# Patient Record
Sex: MALE | Race: WHITE | NOT HISPANIC OR LATINO | Employment: FULL TIME | URBAN - METROPOLITAN AREA
[De-identification: names, ages, dates, MRNs, and addresses within clinical notes are randomized per-mention and may not be internally consistent; named-entity substitution may affect disease eponyms.]

---

## 2024-04-10 DIAGNOSIS — E11.40 DIABETIC NEUROPATHY, PAINFUL: Primary | ICD-10-CM

## 2024-04-10 DIAGNOSIS — G62.9 NEUROPATHY: ICD-10-CM

## 2024-05-08 ENCOUNTER — OFFICE VISIT (OUTPATIENT)
Dept: PAIN MEDICINE | Facility: CLINIC | Age: 54
End: 2024-05-08
Payer: COMMERCIAL

## 2024-05-08 VITALS
HEART RATE: 81 BPM | BODY MASS INDEX: 31.66 KG/M2 | DIASTOLIC BLOOD PRESSURE: 77 MMHG | WEIGHT: 197 LBS | HEIGHT: 66 IN | SYSTOLIC BLOOD PRESSURE: 148 MMHG

## 2024-05-08 DIAGNOSIS — M54.31 SCIATICA OF RIGHT SIDE: Primary | ICD-10-CM

## 2024-05-08 DIAGNOSIS — R29.898 WEAKNESS OF BOTH LOWER EXTREMITIES: ICD-10-CM

## 2024-05-08 DIAGNOSIS — E11.40 DIABETIC NEUROPATHY, PAINFUL: ICD-10-CM

## 2024-05-08 DIAGNOSIS — M70.61 GREATER TROCHANTERIC BURSITIS OF RIGHT HIP: ICD-10-CM

## 2024-05-08 PROCEDURE — 1159F MED LIST DOCD IN RCRD: CPT | Mod: CPTII,S$GLB,, | Performed by: PHYSICAL MEDICINE & REHABILITATION

## 2024-05-08 PROCEDURE — 99204 OFFICE O/P NEW MOD 45 MIN: CPT | Mod: S$GLB,,, | Performed by: PHYSICAL MEDICINE & REHABILITATION

## 2024-05-08 PROCEDURE — 1160F RVW MEDS BY RX/DR IN RCRD: CPT | Mod: CPTII,S$GLB,, | Performed by: PHYSICAL MEDICINE & REHABILITATION

## 2024-05-08 PROCEDURE — 4010F ACE/ARB THERAPY RXD/TAKEN: CPT | Mod: CPTII,S$GLB,, | Performed by: PHYSICAL MEDICINE & REHABILITATION

## 2024-05-08 PROCEDURE — 3078F DIAST BP <80 MM HG: CPT | Mod: CPTII,S$GLB,, | Performed by: PHYSICAL MEDICINE & REHABILITATION

## 2024-05-08 PROCEDURE — 3008F BODY MASS INDEX DOCD: CPT | Mod: CPTII,S$GLB,, | Performed by: PHYSICAL MEDICINE & REHABILITATION

## 2024-05-08 PROCEDURE — 3077F SYST BP >= 140 MM HG: CPT | Mod: CPTII,S$GLB,, | Performed by: PHYSICAL MEDICINE & REHABILITATION

## 2024-05-08 RX ORDER — ASPIRIN 81 MG/1
81 TABLET ORAL DAILY
COMMUNITY

## 2024-05-08 RX ORDER — INDOMETHACIN 50 MG/1
50 CAPSULE ORAL
COMMUNITY
Start: 2024-03-05

## 2024-05-08 RX ORDER — RIVAROXABAN 2.5 MG/1
2.5 TABLET, FILM COATED ORAL 2 TIMES DAILY
COMMUNITY
Start: 2024-04-24

## 2024-05-08 RX ORDER — ROSUVASTATIN CALCIUM 10 MG/1
10 TABLET, COATED ORAL NIGHTLY
COMMUNITY
Start: 2024-03-02

## 2024-05-08 RX ORDER — CARVEDILOL 12.5 MG/1
12.5 TABLET ORAL 2 TIMES DAILY
COMMUNITY
Start: 2024-05-06

## 2024-05-08 RX ORDER — CLOPIDOGREL BISULFATE 75 MG/1
75 TABLET ORAL DAILY
COMMUNITY

## 2024-05-08 RX ORDER — EMPAGLIFLOZIN, METFORMIN HYDROCHLORIDE 5; 1000 MG/1; MG/1
TABLET, EXTENDED RELEASE ORAL
COMMUNITY
Start: 2024-04-05

## 2024-05-08 RX ORDER — SACUBITRIL AND VALSARTAN 24; 26 MG/1; MG/1
1 TABLET, FILM COATED ORAL 2 TIMES DAILY
COMMUNITY
Start: 2024-03-15

## 2024-05-08 RX ORDER — GABAPENTIN 300 MG/1
300 CAPSULE ORAL 2 TIMES DAILY
COMMUNITY
Start: 2024-04-05

## 2024-05-08 NOTE — PROGRESS NOTES
[facility]      Referring Provider: Yasmany Lima Md  [address]  [address] [address]    Chief Complaint:   Chief Complaint   Patient presents with    Leg Pain     right       History of Present Illness: Song Quezada is a 54 y.o. male referred by Dr. Yasmany Lima for right leg (concerned about possible neuropathy).      Onset: Was having back and right leg pain that was constant, but couldn't figure out what was causing it, and he was developing an ulcer between his toes, then he went to ER at [facility] and was told he had no pulses in his right foot and he underwent angiogram at [facility] and had 2 stents placed that has improved some of the pain and has also helped his ulcer heel on his toes. He is wound care right now with Dr. BOWENS. His back pain improved after the vascular intervention, but still having some pains    Currently right pain is described as:  Location: right leg anteriorly  Radiation: right leg to the top of the foot to the middle toes.   Timing: intermittent, short bursts of shooting pain (30 seconds maybe 30 min)  Quality: Aching, Numb, Sharp, Stabbing, and Shooting  Exacerbating Factors: random, no particular time or activity  Alleviating Factors: nothing  Associated Symptoms: He has some numbness in both feet. He feels weak in both legs. He denies night fever/night sweats, urinary incontinence/change in function, bowel incontinence/change in function, and unexplained weight loss    Severity: Currently: 5/10   Typical Range: 5-6/10     Exacerbation: 6/10     Functional Limitations: Pain is limiting his ability work, walking around his house.    Employment Status: construction    P = 5  E = 6  G = 6  Baseline PEG Score = 5.67  Current PEG Score: 5.67    Opioid Risk Score         Value Time User    Opioid Risk Score  0 5/8/2024  1:13 PM Radha Bahena MA             Previous Interventions:  - Chiroptactor treatment    Previous Therapies:  PT/OT: no   Relevant  Surgery: no   Previous Medications:   - NSAIDS: indomethacin  - Muscle Relaxants:    - TCAs:   - SNRIs:   - Topicals:   - Anticonvulsants: gabapentin   - Opioids:     Current Pain Medications:  Gabapentin 300 (prn)  Indomethacin 50mg (prn)     Blood Thinners: Xarelto, Plavix, ASA (Dr [facility])    Full Medication List:    Current Outpatient Medications:     aspirin (ECOTRIN) 81 MG EC tablet, Take 81 mg by mouth once daily., Disp: , Rfl:     carvediloL (COREG) 12.5 MG tablet, Take 12.5 mg by mouth 2 (two) times daily., Disp: , Rfl:     clopidogreL (PLAVIX) 75 mg tablet, Take 75 mg by mouth once daily., Disp: , Rfl:     ENTRESTO 24-26 mg per tablet, Take 1 tablet by mouth 2 (two) times daily., Disp: , Rfl:     gabapentin (NEURONTIN) 300 MG capsule, Take 300 mg by mouth 2 (two) times daily., Disp: , Rfl:     indomethacin (INDOCIN) 50 MG capsule, Take 50 mg by mouth., Disp: , Rfl:     rosuvastatin (CRESTOR) 10 MG tablet, Take 10 mg by mouth every evening., Disp: , Rfl:     SYNJARDY XR 5-1,000 mg TBph, , Disp: , Rfl:     XARELTO 2.5 mg Tab, Take 2.5 mg by mouth 2 (two) times daily., Disp: , Rfl:      Review of Systems: See HPI    Allergies:  Patient has no known allergies.     Medical History:   has a past medical history of CAD (coronary artery disease), Diabetes mellitus, type 2, History of heart attack, Hyperlipidemia, Hypertension, Lumbar radiculopathy, PAD (peripheral artery disease), and PVD (peripheral vascular disease).    Surgical History:   has a past surgical history that includes Coronary artery bypass graft; Aortic valve replacement; Cardiac defibrillator placement; Retinal laser procedure; and Femoral bypass.    Family History:  family history includes Atrial Septal Defect in his father; No Known Problems in his mother.    Social History:   reports that he has quit smoking. His smoking use included cigarettes. He has never been exposed to tobacco smoke. He has never used smokeless tobacco. He reports  "that he does not currently use alcohol. He reports that he does not currently use drugs.    Physical Exam:  BP (!) 148/77   Pulse 81   Ht 5' 6" (1.676 m)   Wt 89.4 kg (197 lb)   BMI 31.80 kg/m²   GEN: No acute distress. Calm, comfortable  HENT: Normocephalic, atraumatic, moist mucous membranes  EYE: Anicteric sclera, non-injected.   CV: Non-diaphoretic. Regular Rate. Radial Pulses 2+.   Weak pulses bilaterally over DP and PT (right weaker than left)  RESP: Breathing comfortably. Chest expansion symmetric.  EXT: No clubbing, cyanosis.    Erythema of right foot   1+ pitting edema in b/l feet and lower legs  SKIN: Warm, & dry to palpation. No visible rashes or lesions of exposed skin.   PSYCH: Pleasant mood and appropriate affect. Recent and remote memory intact.   GAIT: Independent, normal ambulation  Lumbar Spine Exam:       Inspection: No erythema, bruising. No Lateral shift appreciated.       Palpation: (+) TTP of lumbar paraspinals and SIJ on the right       ROM: Slightly Limited in flexion, extension, lateral bending.    Directional Preference: None      Provocative Maneuvers:  (-) Facet loading bilaterally  (+) Straight Leg Raise on right  (-) TAHIRA bilaterally  Hip Exam:      Inspection: No gross deformity or apparent leg length discrepancy      Palpation: (+) TTP to right greater trochanteric bursa.       ROM: (+) limitation in internal rotation b/l, external rotation WNL b/l  Neurologic Exam:     Alert. Speech is fluent and appropriate.     Strength: 4/5 in b/l hip flexion, right knee extension, right EHL, right APF, right ADF, otherwise 5/5 throughout bilateral lower extremities     Sensation:  Abnormal to LT diffusely in right foot and bottom of left foot, otherwise Grossly intact to light touch in bilateral lower extremities     Reflexes: 1+ in b/l patella, absent in b/l achilles     Tone: No abnormality appreciated in bilateral lower extremities     No Clonus           Imaging:  -     Labs:  Last A1c " "7.4 in Dec 2023.     No results found for: "NA", "K", "CL", "CO2", "BUN", "CREATININE", "CALCIUM", "ANIONGAP", "EGFRNORACEVR"  No results found for: "ALT", "AST", "GGT", "ALKPHOS", "BILITOT"  No results found for: "PLT"    Assessment:  Song Quezada is a 54 y.o. male with the following diagnoses based on history, exam, and imaging:    Problem List Items Addressed This Visit    None  Visit Diagnoses       Sciatica of right side    -  Primary    Relevant Orders    X-Ray Lumbar Spine AP And Lateral    Ambulatory referral/consult to Physical/Occupational Therapy    Diabetic neuropathy, painful        Weakness of both lower extremities        Relevant Orders    Ambulatory referral/consult to Physical/Occupational Therapy    Greater trochanteric bursitis of right hip        Relevant Orders    Ambulatory referral/consult to Physical/Occupational Therapy            This is a pleasant 54 y.o. gentleman presenting with:     - Chronic right leg pain: He does have weakness and numbness in his leg and (+) SLR on the right indicating some possible lumbar contributions, though I still see signs of vascular disease in the legs that are likely contributing as well.   - Comorbidities: CAD s/p MI s/p CABG. AICD in place (Dr. Solis in 2020).  PAD and PVD. DM2.     Treatment Plan:   - PT/OT/HEP: Refer to PT. Discussed benefits of exercise for pain.   - Procedures: Consider right L5 TF GREER depending on imaging findings   - If lumbar spine imaging not consistent with lumbar radiculopathy, consider right lumbar sympathetic block to help with blood flow and healing of ulcer on right foot.    - Medications:    - Consider pletal with vascular team  - Imaging: Reviewed. X-ray lumbar spine.    - Need to get CT of lumbar spine he had at [facility]   - See if AICD is MRI compatible with Dr. Solis's clinic.   - Labs: Request CBC, CMP from PCP      Follow Up: RTC in 6-8 weeks or sooner PRN      Cecilia Ramos MD  Interventional Pain Medicine   "

## 2024-05-14 ENCOUNTER — TELEPHONE (OUTPATIENT)
Dept: PAIN MEDICINE | Facility: CLINIC | Age: 54
End: 2024-05-14
Payer: COMMERCIAL

## 2024-05-14 NOTE — TELEPHONE ENCOUNTER
notified to reach out to Dr. Solis's office to see if his defibrillator is MRI compatible, patient verbalized understanding he will contact office to relay Dr. Solis's reply.

## 2024-05-14 NOTE — TELEPHONE ENCOUNTER
----- Message from Cecilia Ramos MD sent at 5/8/2024  4:48 PM CDT -----  Imaging reviewed. There is a compression fracture at L1 of unknown chronicity. Needs MRI or bone scan to truly evaluate.     Please call patient and let him know and reach out to Dr. Solis's office to see if defibrillator is MRI compatible.

## 2024-05-21 ENCOUNTER — TELEPHONE (OUTPATIENT)
Dept: PAIN MEDICINE | Facility: CLINIC | Age: 54
End: 2024-05-21
Payer: COMMERCIAL

## 2024-05-21 NOTE — TELEPHONE ENCOUNTER
----- Message from Ariadne Lambert sent at 5/21/2024 10:33 AM CDT -----  Pt is calling in regards to informing provide that his defibrillator is [employer] compatible for MRI machine. Pt can be reached at [phone].             Thanks  LUISITO

## 2024-05-21 NOTE — TELEPHONE ENCOUNTER
Pt spoke with Dr Dariusz De La Cruz ([facility] Cardiologist) and reports that   the pacemaker he has is by [employer] and it is MRI compatible.       I was able to call [facility] ([phone])  who confirmed the above information. Per representative, pt has [employer] Claria Defibrillator (QUAD CRTD/ [id number]) that was placed 3/2021 and is compatible for MRI 1.5 or 3 armando.     This representative mentioned that Mr Quezada also has a [employer] heart tissue valve device that was placed on 2/24/20. She will email us the supporting  documentation that is needed for this device. (Will upload to pt's chart, once received).     I also put in a request to have cards for both devices mailed to Mr Quezada.

## 2024-06-10 ENCOUNTER — TELEPHONE (OUTPATIENT)
Dept: PAIN MEDICINE | Facility: CLINIC | Age: 54
End: 2024-06-10
Payer: COMMERCIAL

## 2024-06-10 NOTE — TELEPHONE ENCOUNTER
Imaging pending until we received [employer] cards. [employer] device cards are now scanned in, please advise.

## 2024-06-26 ENCOUNTER — OFFICE VISIT (OUTPATIENT)
Dept: PAIN MEDICINE | Facility: CLINIC | Age: 54
End: 2024-06-26
Payer: COMMERCIAL

## 2024-06-26 VITALS
SYSTOLIC BLOOD PRESSURE: 97 MMHG | OXYGEN SATURATION: 96 % | HEIGHT: 66 IN | BODY MASS INDEX: 30.22 KG/M2 | WEIGHT: 188 LBS | DIASTOLIC BLOOD PRESSURE: 61 MMHG

## 2024-06-26 DIAGNOSIS — R29.898 WEAKNESS OF BOTH LOWER EXTREMITIES: ICD-10-CM

## 2024-06-26 DIAGNOSIS — M54.16 LUMBAR RADICULOPATHY, CHRONIC: Primary | ICD-10-CM

## 2024-06-26 DIAGNOSIS — S32.010A CLOSED COMPRESSION FRACTURE OF BODY OF L1 VERTEBRA: ICD-10-CM

## 2024-06-26 DIAGNOSIS — G89.29 CHRONIC RIGHT-SIDED LOW BACK PAIN WITH RIGHT-SIDED SCIATICA: ICD-10-CM

## 2024-06-26 DIAGNOSIS — M54.41 CHRONIC RIGHT-SIDED LOW BACK PAIN WITH RIGHT-SIDED SCIATICA: ICD-10-CM

## 2024-06-26 DIAGNOSIS — M54.31 SCIATICA OF RIGHT SIDE: ICD-10-CM

## 2024-06-26 PROCEDURE — 3008F BODY MASS INDEX DOCD: CPT | Mod: CPTII,S$GLB,, | Performed by: PHYSICIAN ASSISTANT

## 2024-06-26 PROCEDURE — 99214 OFFICE O/P EST MOD 30 MIN: CPT | Mod: S$GLB,,, | Performed by: PHYSICIAN ASSISTANT

## 2024-06-26 PROCEDURE — 4010F ACE/ARB THERAPY RXD/TAKEN: CPT | Mod: CPTII,S$GLB,, | Performed by: PHYSICIAN ASSISTANT

## 2024-06-26 PROCEDURE — 3074F SYST BP LT 130 MM HG: CPT | Mod: CPTII,S$GLB,, | Performed by: PHYSICIAN ASSISTANT

## 2024-06-26 PROCEDURE — 3078F DIAST BP <80 MM HG: CPT | Mod: CPTII,S$GLB,, | Performed by: PHYSICIAN ASSISTANT

## 2024-06-26 PROCEDURE — 1159F MED LIST DOCD IN RCRD: CPT | Mod: CPTII,S$GLB,, | Performed by: PHYSICIAN ASSISTANT

## 2024-06-26 RX ORDER — ROSUVASTATIN CALCIUM 20 MG/1
TABLET, COATED ORAL
COMMUNITY
Start: 2024-06-25

## 2024-06-26 NOTE — PROGRESS NOTES
[facility]      Chief Complaint:   Chief Complaint   Patient presents with    Leg Pain     Right        History of Present Illness: Song Quezada is a 54 y.o. male referred by Dr. Yasmany Lima for right leg (concerned about possible neuropathy).      Onset: Was having back and right leg pain that was constant, but couldn't figure out what was causing it, and he was developing an ulcer between his toes, then he went to ER at [facility] and was told he had no pulses in his right foot and he underwent angiogram at [facility] and had 2 stents placed that has improved some of the pain and has also helped his ulcer heel on his toes. He is wound care right now with Dr. BOWENS. His back pain improved after the vascular intervention, but still having some pains    Currently right pain is described as:  Location: right leg anteriorly  Radiation: right leg to the top of the foot to the middle toes.   Timing: intermittent, short bursts of shooting pain (30 seconds maybe 30 min)  Quality: Aching, Numb, Sharp, Stabbing, and Shooting  Exacerbating Factors: random, no particular time or activity  Alleviating Factors: nothing  Associated Symptoms: He has some numbness in both feet. He feels weak in both legs. He denies night fever/night sweats, urinary incontinence/change in function, bowel incontinence/change in function, and unexplained weight loss    Severity: Currently: 5/10   Typical Range: 5-6/10     Exacerbation: 6/10     Functional Limitations: Pain is limiting his ability work, walking around his house.    Employment Status: construction    P = 5  E = 6  G = 6  Baseline PEG Score = 5.67      Interval History (06/26/2024):  Song Quezada returns today for follow up.  At the last clinic visit, referred to physical therapy, X-ray lumbar spine    Physical therapy provided 35% relief.     Currently, the right leg pain is improved in severity but still present. Pain continues to radiate from right lower back,  radiates down lateral thigh and posterior calf, occasionally into foot. Denies any changes in bowel or bladder function. Denies any new weakness or new numbness since the most recent visit. Denies any fevers or recent infections/antibiotics. Denies any unexplained weight loss.     Since prior visit, he had TIA on 6/24 (symptoms of weakness and numbness to left UE and LE- which have since resolved). He presented to [facility] ED for evaluation, had CT w/ and w/o contrast and MRI w/ and w/o contrast, was diagnosed with TIA per patient (pending records). Medications were changed- taken off Plavix and placed on Brilinta. Continues to take ASA and Xarelto.      Current Pain Scales:  Current: 2/10              Typical Range: 2-8/10   Current PEG Score: 5    Opioid Risk Score         Value Time User    Opioid Risk Score  0 5/8/2024  1:13 PM Radha Bahena MA             Previous Interventions:  - Chiroptactor treatment    Previous Therapies:  PT/OT: no   Relevant Surgery: no   Previous Medications:   - NSAIDS: indomethacin  - Muscle Relaxants:    - TCAs:   - SNRIs:   - Topicals:   - Anticonvulsants: gabapentin   - Opioids:     Current Pain Medications:  Gabapentin 300 (prn)  Indomethacin 50mg (prn)     Blood Thinners: Xarelto, Brilinta, ASA (Dr De La Cruz-[facility])    Full Medication List:    Current Outpatient Medications:     aspirin (ECOTRIN) 81 MG EC tablet, Take 81 mg by mouth once daily., Disp: , Rfl:     carvediloL (COREG) 12.5 MG tablet, Take 12.5 mg by mouth 2 (two) times daily., Disp: , Rfl:     ENTRESTO 24-26 mg per tablet, Take 1 tablet by mouth 2 (two) times daily., Disp: , Rfl:     gabapentin (NEURONTIN) 300 MG capsule, Take 300 mg by mouth 2 (two) times daily., Disp: , Rfl:     rosuvastatin (CRESTOR) 20 MG tablet, , Disp: , Rfl:     SYNJARDY XR 5-1,000 mg TBph, , Disp: , Rfl:     XARELTO 2.5 mg Tab, Take 2.5 mg by mouth 2 (two) times daily., Disp: , Rfl:     ticagrelor (BRILINTA) 90 mg tablet, Take 90 mg by mouth 2  "(two) times daily., Disp: , Rfl:      Review of Systems: See HPI    Allergies:  Patient has no known allergies.     Medical History:   has a past medical history of CAD (coronary artery disease), Diabetes mellitus, type 2, History of heart attack, Hyperlipidemia, Hypertension, Lumbar radiculopathy, PAD (peripheral artery disease), and PVD (peripheral vascular disease).    Surgical History:   has a past surgical history that includes Coronary artery bypass graft; Aortic valve replacement; Cardiac defibrillator placement; Retinal laser procedure; and Femoral bypass.    Family History:  family history includes Atrial Septal Defect in his father; No Known Problems in his mother.    Social History:   reports that he has quit smoking. His smoking use included cigarettes. He has never been exposed to tobacco smoke. He has never used smokeless tobacco. He reports that he does not currently use alcohol. He reports that he does not currently use drugs.    Physical Exam:  BP 97/61   Ht 5' 6" (1.676 m)   Wt 85.3 kg (188 lb)   SpO2 96%   BMI 30.34 kg/m²   GEN: No acute distress. Calm, comfortable  HENT: Normocephalic, atraumatic, moist mucous membranes  EYE: Anicteric sclera, non-injected.   CV: Non-diaphoretic. Regular Rate. Radial Pulses 2+.   Weak pulses bilaterally over DP and PT (right weaker than left)  RESP: Breathing comfortably. Chest expansion symmetric.  EXT: No clubbing, cyanosis.    Erythema of right foot   1+ pitting edema in b/l feet and lower legs  SKIN: Warm, & dry to palpation. No visible rashes or lesions of exposed skin.   PSYCH: Pleasant mood and appropriate affect. Recent and remote memory intact.   GAIT: Independent, normal ambulation  Lumbar Spine Exam:       Inspection: No erythema, bruising. No Lateral shift appreciated.       Palpation: (+) TTP of lumbar paraspinals and SIJ on the right       ROM: Slightly Limited in flexion, extension, lateral bending.    Directional Preference: None      " "Provocative Maneuvers:  (-) Facet loading bilaterally  (+) Straight Leg Raise on right  (-) TAHIRA bilaterally  Hip Exam:      Inspection: No gross deformity or apparent leg length discrepancy      Palpation: (+) TTP to right greater trochanteric bursa.       ROM: (+) limitation in internal rotation b/l, external rotation WNL b/l  Neurologic Exam:     Alert. Speech is fluent and appropriate.     Strength: 4/5 in b/l hip flexion, right knee extension, right EHL, right APF, right ADF, otherwise 5/5 throughout bilateral lower extremities     Sensation:  Abnormal to LT diffusely in right foot and bottom of left foot, otherwise Grossly intact to light touch in bilateral lower extremities     Reflexes: 1+ in b/l patella, absent in b/l achilles     Tone: No abnormality appreciated in bilateral lower extremities     No Clonus           Imaging:  - X-ray Lumbar spine 5/8/24:   Chronic appearing loss of vertebral body height with mild wedging at L1.  Lumbar vertebral body alignment are normal.  No evidence of acute fracture.  Moderate facet arthropathy L5/S1.  Mild multilevel spondylosis.  Multilevel disc space narrowing throughout the visualized thoracolumbar spine.     Dense atherosclerotic disease.  Moderate constipation.    Labs:  Last A1c 7.4 in Dec 2023.     No results found for: "NA", "K", "CL", "CO2", "BUN", "CREATININE", "CALCIUM", "ANIONGAP", "EGFRNORACEVR"  No results found for: "ALT", "AST", "GGT", "ALKPHOS", "BILITOT"  No results found for: "PLT"    Assessment:  Song Quezada is a 54 y.o. male with the following diagnoses based on history, exam, and imaging:    Problem List Items Addressed This Visit    None  Visit Diagnoses       Lumbar radiculopathy, chronic    -  Primary    Relevant Orders    MRI Lumbar Spine Without Contrast    Sciatica of right side        Weakness of both lower extremities        Chronic right-sided low back pain with right-sided sciatica        Relevant Orders    MRI Lumbar Spine " Without Contrast    Closed compression fracture of body of L1 vertebra        Relevant Orders    MRI Lumbar Spine Without Contrast              This is a pleasant 54 y.o. gentleman presenting with:     - Chronic right leg pain: He does have weakness and numbness in his leg and (+) SLR on the right indicating some possible lumbar contributions, though I still see signs of vascular disease in the legs that are likely contributing as well.   - Chronic lower back pain: Compression fracture at L1 of unknown chronicity, pending advanced imaging.  - Comorbidities: CAD s/p MI s/p CABG. AICD in place (Dr. Solis in 2020).  PAD and PVD. TIA on Brillinta. DM2.     Treatment Plan:   - PT/OT/HEP: Continue PT- avoidance of flexion based exercises. Discussed benefits of exercise for pain.   - Procedures: Consider right L5 TF GREER pending MRI review.    - If lumbar spine imaging not consistent with lumbar radiculopathy, consider right lumbar sympathetic block to help with blood flow and healing of ulcer on right foot.    - Medications:    - Consider pletal with vascular team  - Imaging: Reviewed. Ordered MRI, AICD is MRI compatible.  - Labs: Re-Request CBC, CMP from PCP      Follow Up: RTC in 4-6 weeks for imaging review.    I spent greater than 38 minutes in total in todays visit including face-to-face time with the patient, and time reviewing records/imaging/labs, and documenting.     Barby Quintero PA-C  Interventional Pain Medicine

## 2024-07-09 ENCOUNTER — TELEPHONE (OUTPATIENT)
Dept: PAIN MEDICINE | Facility: CLINIC | Age: 54
End: 2024-07-09
Payer: COMMERCIAL

## 2024-07-09 NOTE — TELEPHONE ENCOUNTER
----- Message from Lisa Blackwood sent at 7/9/2024  8:13 AM CDT -----  Contact: Song  Patient is calling to speak with someone regarding letter. Patient reports receiving approval for MRI through [employer] insurance and request to confirm if needing to provide letter in person. Please give patient a call back at [phone] to discuss further.   Thank you,  GH

## 2024-08-19 NOTE — PROGRESS NOTES
Imaging reviewed.  There is note of disc herniation at L5-S1 with contact of S1 nerve root which may be contributing to the pain down the leg. We could attempt S1 TF GREER if pain not improving.     Please pull images from [facility] into Epic.

## 2024-08-28 ENCOUNTER — OFFICE VISIT (OUTPATIENT)
Dept: PAIN MEDICINE | Facility: CLINIC | Age: 54
End: 2024-08-28
Payer: COMMERCIAL

## 2024-08-28 VITALS
HEIGHT: 66 IN | WEIGHT: 200.19 LBS | OXYGEN SATURATION: 99 % | HEART RATE: 81 BPM | BODY MASS INDEX: 32.17 KG/M2 | SYSTOLIC BLOOD PRESSURE: 98 MMHG | DIASTOLIC BLOOD PRESSURE: 60 MMHG

## 2024-08-28 DIAGNOSIS — E11.40 DIABETIC NEUROPATHY, PAINFUL: ICD-10-CM

## 2024-08-28 DIAGNOSIS — M54.31 SCIATICA OF RIGHT SIDE: ICD-10-CM

## 2024-08-28 DIAGNOSIS — G89.29 CHRONIC RIGHT-SIDED LOW BACK PAIN WITH RIGHT-SIDED SCIATICA: ICD-10-CM

## 2024-08-28 DIAGNOSIS — M54.16 LUMBAR RADICULOPATHY, CHRONIC: Primary | ICD-10-CM

## 2024-08-28 DIAGNOSIS — M54.41 CHRONIC RIGHT-SIDED LOW BACK PAIN WITH RIGHT-SIDED SCIATICA: ICD-10-CM

## 2024-08-28 DIAGNOSIS — R29.898 WEAKNESS OF BOTH LOWER EXTREMITIES: ICD-10-CM

## 2024-08-28 NOTE — PROGRESS NOTES
[facility]      Chief Complaint:   Chief Complaint   Patient presents with    Low-back Pain       History of Present Illness: Song Quezada is a 54 y.o. male referred by Dr. Yasmany Lima for right leg (concerned about possible neuropathy).      Onset: Was having back and right leg pain that was constant, but couldn't figure out what was causing it, and he was developing an ulcer between his toes, then he went to ER at [facility] and was told he had no pulses in his right foot and he underwent angiogram at [facility] and had 2 stents placed that has improved some of the pain and has also helped his ulcer heel on his toes. He is wound care right now with Dr. BOWENS. His back pain improved after the vascular intervention, but still having some pains    Currently right pain is described as:  Location: right leg anteriorly  Radiation: right leg to the top of the foot to the middle toes.   Timing: intermittent, short bursts of shooting pain (30 seconds maybe 30 min)  Quality: Aching, Numb, Sharp, Stabbing, and Shooting  Exacerbating Factors: random, no particular time or activity  Alleviating Factors: nothing  Associated Symptoms: He has some numbness in both feet. He feels weak in both legs. He denies night fever/night sweats, urinary incontinence/change in function, bowel incontinence/change in function, and unexplained weight loss    Severity: Currently: 5/10   Typical Range: 5-6/10     Exacerbation: 6/10     Functional Limitations: Pain is limiting his ability work, walking around his house.    Employment Status: construction    P = 5  E = 6  G = 6  Baseline PEG Score = 5.67      Interval History (06/26/2024):  Song Quezada returns today for follow up.  At the last clinic visit, referred to physical therapy, X-ray lumbar spine    Physical therapy provided 35% relief.     Currently, the right leg pain is improved in severity but still present. Pain continues to radiate from right lower back,  radiates down lateral thigh and posterior calf, occasionally into foot. Denies any changes in bowel or bladder function. Denies any new weakness or new numbness since the most recent visit. Denies any fevers or recent infections/antibiotics. Denies any unexplained weight loss.     Since prior visit, he had TIA on 6/24 (symptoms of weakness and numbness to left UE and LE- which have since resolved). He presented to [facility] ED for evaluation, had CT w/ and w/o contrast and MRI w/ and w/o contrast, was diagnosed with TIA per patient (pending records). Medications were changed- taken off Plavix and placed on Brilinta. Continues to take ASA and Xarelto.      Current Pain Scales:  Current: 2/10              Typical Range: 2-8/10     Interval History (08/28/2024):  Song Quezada returns today for follow up.  At the last clinic visit, ordered imaging, MRI lumbar Spine will consider right L5 TF GREER pending results.    He has completed PT, states this has provided 60% relief in pain. He is continuing HEP.    Currently, the low back pain is improved in both severity and frequency. Now only has occasional pain to right lower back, into right lower extremity into right foot, but this rare. Denies new numbness or weakness. Denies any changes in bowel or bladder function.     He would not like to pursue interventional procedures at this time, as pain is improved.      Current Pain Scales:  Current: 2/10              Typical Range: 2-6/10   Current PEG Score: 3.33    Opioid Risk Score         Value Time User    Opioid Risk Score  0 5/8/2024  1:13 PM Radha Bahena MA             Previous Interventions:  - Chiroptactor treatment    Previous Therapies:  PT/OT: no   Relevant Surgery: no   Previous Medications:   - NSAIDS: indomethacin  - Muscle Relaxants:    - TCAs:   - SNRIs:   - Topicals:   - Anticonvulsants: gabapentin   - Opioids:     Current Pain Medications:  Gabapentin 300 (prn)     Blood Thinners: Xarelto, Brilinta, ASA  "(Dr [facility])    Full Medication List:    Current Outpatient Medications:     aspirin (ECOTRIN) 81 MG EC tablet, Take 81 mg by mouth once daily., Disp: , Rfl:     carvediloL (COREG) 12.5 MG tablet, Take 12.5 mg by mouth 2 (two) times daily., Disp: , Rfl:     ENTRESTO 24-26 mg per tablet, Take 1 tablet by mouth 2 (two) times daily., Disp: , Rfl:     gabapentin (NEURONTIN) 300 MG capsule, Take 300 mg by mouth 2 (two) times daily., Disp: , Rfl:     rosuvastatin (CRESTOR) 20 MG tablet, , Disp: , Rfl:     SYNJARDY XR 5-1,000 mg TBph, , Disp: , Rfl:     ticagrelor (BRILINTA) 90 mg tablet, Take 90 mg by mouth 2 (two) times daily., Disp: , Rfl:     XARELTO 2.5 mg Tab, Take 2.5 mg by mouth 2 (two) times daily., Disp: , Rfl:      Review of Systems: See HPI    Allergies:  Patient has no known allergies.     Medical History:   has a past medical history of CAD (coronary artery disease), Diabetes mellitus, type 2, History of heart attack, Hyperlipidemia, Hypertension, Lumbar radiculopathy, PAD (peripheral artery disease), and PVD (peripheral vascular disease).    Surgical History:   has a past surgical history that includes Coronary artery bypass graft; Aortic valve replacement; Cardiac defibrillator placement; Retinal laser procedure; and Femoral bypass.    Family History:  family history includes Atrial Septal Defect in his father; No Known Problems in his mother.    Social History:   reports that he has quit smoking. His smoking use included cigarettes. He has never been exposed to tobacco smoke. He has never used smokeless tobacco. He reports that he does not currently use alcohol. He reports that he does not currently use drugs.    Physical Exam:  BP 98/60   Pulse 81   Ht 5' 6" (1.676 m)   Wt 90.8 kg (200 lb 3.2 oz)   SpO2 99%   BMI 32.31 kg/m²   GEN: No acute distress. Calm, comfortable  HENT: Normocephalic, atraumatic, moist mucous membranes  EYE: Anicteric sclera, non-injected.   CV: Non-diaphoretic. Regular " Rate. Radial Pulses 2+.   Weak pulses bilaterally over DP and PT (right weaker than left)  RESP: Breathing comfortably. Chest expansion symmetric.  EXT: No clubbing, cyanosis.   SKIN: Warm, & dry to palpation. No visible rashes or lesions of exposed skin.   PSYCH: Pleasant mood and appropriate affect. Recent and remote memory intact.   GAIT: Independent, normal ambulation  Lumbar Spine Exam:       Inspection: No erythema, bruising. No Lateral shift appreciated.       Palpation: (+) TTP of lumbar paraspinals and SIJ on the right       ROM: Slightly Limited in flexion, extension, lateral bending.    Directional Preference: None      Provocative Maneuvers:  (-) Facet loading bilaterally  (+) Straight Leg Raise on right  (-) TAHIRA bilaterally  Hip Exam:      Inspection: No gross deformity or apparent leg length discrepancy      Palpation: (+) TTP to right greater trochanteric bursa.       ROM: (+) limitation in internal rotation b/l, external rotation WNL b/l  Neurologic Exam:     Alert. Speech is fluent and appropriate.     Strength: 4/5 in b/l hip flexion, right knee extension, right EHL, right APF, right ADF, otherwise 5/5 throughout bilateral lower extremities     Sensation:  Abnormal to LT diffusely in right foot and bottom of left foot, otherwise Grossly intact to light touch in bilateral lower extremities     Reflexes: 1+ in b/l patella, absent in b/l achilles     Tone: No abnormality appreciated in bilateral lower extremities     No Clonus           Imaging:  -MRI Lumbar Spine 8/19/24:        - X-ray Lumbar spine 5/8/24:   Chronic appearing loss of vertebral body height with mild wedging at L1.  Lumbar vertebral body alignment are normal.  No evidence of acute fracture.  Moderate facet arthropathy L5/S1.  Mild multilevel spondylosis.  Multilevel disc space narrowing throughout the visualized thoracolumbar spine.     Dense atherosclerotic disease.  Moderate constipation.    Labs:  Last A1c 7.4 in Dec 2023.     No  "results found for: "NA", "K", "CL", "CO2", "BUN", "CREATININE", "CALCIUM", "ANIONGAP", "EGFRNORACEVR"  No results found for: "ALT", "AST", "GGT", "ALKPHOS", "BILITOT"  No results found for: "PLT"    Assessment:  Song Quezada is a 54 y.o. male with the following diagnoses based on history, exam, and imaging:    Problem List Items Addressed This Visit    None  Visit Diagnoses       Lumbar radiculopathy, chronic    -  Primary    Sciatica of right side        Weakness of both lower extremities        Chronic right-sided low back pain with right-sided sciatica        Diabetic neuropathy, painful                    This is a pleasant 54 y.o. gentleman presenting with:     - Chronic right leg pain: He does have weakness and numbness in his leg and (+) SLR on the right indicating some possible lumbar contributions, though I still see signs of vascular disease in the legs that are likely contributing as well.   - Chronic lower back pain: Chronic anterior wedge deformity at L1   MRI with disc herniation to L5-S1 with contact of S1 nerve roots, possibly contributing to pain down leg.  - Comorbidities: CAD s/p MI s/p CABG. AICD in place (Dr. Solis in 2020).  PAD and PVD. TIA on Brillinta. DM2.     Treatment Plan:   - PT/OT/HEP: Continue HEP learned in PT- avoidance of flexion based exercises.   -Discussed benefits of exercise for pain.   - Procedures: Consider right S1 TF GREER, if not having continued pain relief.   - Medications: No changes recommended at this time.  - Imaging: Reviewed.   - Labs: Re-Request CBC, CMP and A1c from PCP    Follow Up: RTC PRN.    I spent a total of 49 minutes on the day of the visit. This includes face to face time and non-face to face time preparing to see the patient (eg, review of tests), obtaining and/or reviewing separately obtained history, documenting clinical information in the electronic or other health record, independently interpreting results and communicating results to the " patient/family/caregiver, or care coordinator.      Barby Quintero PA-C  Interventional Pain Medicine